# Patient Record
Sex: FEMALE | Race: WHITE | Employment: OTHER | URBAN - METROPOLITAN AREA
[De-identification: names, ages, dates, MRNs, and addresses within clinical notes are randomized per-mention and may not be internally consistent; named-entity substitution may affect disease eponyms.]

---

## 2017-03-10 ASSESSMENT — ENCOUNTER SYMPTOMS
SHORTNESS OF BREATH: 0
ABDOMINAL PAIN: 0

## 2017-03-13 ENCOUNTER — OFFICE VISIT (OUTPATIENT)
Dept: INTERNAL MEDICINE CLINIC | Age: 82
End: 2017-03-13

## 2017-03-13 VITALS
DIASTOLIC BLOOD PRESSURE: 72 MMHG | HEIGHT: 60 IN | SYSTOLIC BLOOD PRESSURE: 132 MMHG | WEIGHT: 143 LBS | BODY MASS INDEX: 28.07 KG/M2 | RESPIRATION RATE: 16 BRPM | HEART RATE: 76 BPM

## 2017-03-13 DIAGNOSIS — E78.00 HYPERCHOLESTEREMIA: ICD-10-CM

## 2017-03-13 DIAGNOSIS — E74.39 GLUCOSE INTOLERANCE (MALABSORPTION): ICD-10-CM

## 2017-03-13 DIAGNOSIS — I10 ESSENTIAL HYPERTENSION: Primary | ICD-10-CM

## 2017-03-13 LAB
A/G RATIO: 1.6 (ref 1.1–2.2)
ALBUMIN SERPL-MCNC: 4.1 G/DL (ref 3.4–5)
ALP BLD-CCNC: 103 U/L (ref 40–129)
ALT SERPL-CCNC: 13 U/L (ref 10–40)
ANION GAP SERPL CALCULATED.3IONS-SCNC: 15 MMOL/L (ref 3–16)
AST SERPL-CCNC: 15 U/L (ref 15–37)
BILIRUB SERPL-MCNC: 0.3 MG/DL (ref 0–1)
BUN BLDV-MCNC: 22 MG/DL (ref 7–20)
CALCIUM SERPL-MCNC: 9.7 MG/DL (ref 8.3–10.6)
CHLORIDE BLD-SCNC: 106 MMOL/L (ref 99–110)
CHOLESTEROL, TOTAL: 233 MG/DL (ref 0–199)
CO2: 23 MMOL/L (ref 21–32)
CREAT SERPL-MCNC: 0.9 MG/DL (ref 0.6–1.2)
GFR AFRICAN AMERICAN: >60
GFR NON-AFRICAN AMERICAN: 60
GLOBULIN: 2.5 G/DL
GLUCOSE BLD-MCNC: 110 MG/DL (ref 70–99)
HDLC SERPL-MCNC: 51 MG/DL (ref 40–60)
LDL CHOLESTEROL CALCULATED: 149 MG/DL
POTASSIUM SERPL-SCNC: 4.8 MMOL/L (ref 3.5–5.1)
SODIUM BLD-SCNC: 144 MMOL/L (ref 136–145)
TOTAL PROTEIN: 6.6 G/DL (ref 6.4–8.2)
TRIGL SERPL-MCNC: 164 MG/DL (ref 0–150)
VLDLC SERPL CALC-MCNC: 33 MG/DL

## 2017-03-13 PROCEDURE — 99214 OFFICE O/P EST MOD 30 MIN: CPT | Performed by: INTERNAL MEDICINE

## 2017-03-13 RX ORDER — PANTOPRAZOLE SODIUM 40 MG/1
TABLET, DELAYED RELEASE ORAL
Qty: 90 TABLET | Refills: 3 | Status: SHIPPED | OUTPATIENT
Start: 2017-03-13 | End: 2018-03-16 | Stop reason: SDUPTHER

## 2017-03-13 RX ORDER — LOVASTATIN 40 MG/1
40 TABLET ORAL DAILY
Qty: 90 TABLET | Refills: 3 | Status: SHIPPED | OUTPATIENT
Start: 2017-03-13 | End: 2017-03-14 | Stop reason: ALTCHOICE

## 2017-03-14 DIAGNOSIS — E78.00 HYPERCHOLESTEREMIA: Primary | ICD-10-CM

## 2017-03-14 RX ORDER — ATORVASTATIN CALCIUM 40 MG/1
40 TABLET, FILM COATED ORAL DAILY
Qty: 90 TABLET | Refills: 3 | OUTPATIENT
Start: 2017-03-14 | End: 2019-03-19 | Stop reason: ALTCHOICE

## 2017-06-29 ENCOUNTER — TELEPHONE (OUTPATIENT)
Dept: INTERNAL MEDICINE CLINIC | Age: 82
End: 2017-06-29

## 2017-06-30 ENCOUNTER — OFFICE VISIT (OUTPATIENT)
Dept: INTERNAL MEDICINE CLINIC | Age: 82
End: 2017-06-30

## 2017-06-30 VITALS
WEIGHT: 144 LBS | DIASTOLIC BLOOD PRESSURE: 72 MMHG | HEART RATE: 80 BPM | RESPIRATION RATE: 16 BRPM | HEIGHT: 60 IN | BODY MASS INDEX: 28.27 KG/M2 | SYSTOLIC BLOOD PRESSURE: 132 MMHG

## 2017-06-30 DIAGNOSIS — H11.30: ICD-10-CM

## 2017-06-30 DIAGNOSIS — E78.00 HYPERCHOLESTEREMIA: Primary | ICD-10-CM

## 2017-06-30 DIAGNOSIS — I10 ESSENTIAL HYPERTENSION: ICD-10-CM

## 2017-06-30 PROCEDURE — 99214 OFFICE O/P EST MOD 30 MIN: CPT | Performed by: INTERNAL MEDICINE

## 2017-06-30 ASSESSMENT — ENCOUNTER SYMPTOMS
SHORTNESS OF BREATH: 0
ABDOMINAL PAIN: 0

## 2017-08-31 ENCOUNTER — TELEPHONE (OUTPATIENT)
Dept: INTERNAL MEDICINE CLINIC | Age: 82
End: 2017-08-31

## 2017-09-12 ASSESSMENT — ENCOUNTER SYMPTOMS
SHORTNESS OF BREATH: 0
ABDOMINAL PAIN: 0

## 2017-09-15 ENCOUNTER — OFFICE VISIT (OUTPATIENT)
Dept: INTERNAL MEDICINE CLINIC | Age: 82
End: 2017-09-15

## 2017-09-15 VITALS
DIASTOLIC BLOOD PRESSURE: 74 MMHG | HEIGHT: 60 IN | HEART RATE: 72 BPM | SYSTOLIC BLOOD PRESSURE: 122 MMHG | BODY MASS INDEX: 27.48 KG/M2 | RESPIRATION RATE: 16 BRPM | WEIGHT: 140 LBS

## 2017-09-15 DIAGNOSIS — Z23 NEED FOR INFLUENZA VACCINATION: ICD-10-CM

## 2017-09-15 DIAGNOSIS — I10 ESSENTIAL HYPERTENSION: ICD-10-CM

## 2017-09-15 DIAGNOSIS — E74.39 GLUCOSE INTOLERANCE (MALABSORPTION): ICD-10-CM

## 2017-09-15 DIAGNOSIS — E78.00 HYPERCHOLESTEREMIA: Primary | ICD-10-CM

## 2017-09-15 LAB
A/G RATIO: 1.4 (ref 1.1–2.2)
ALBUMIN SERPL-MCNC: 3.8 G/DL (ref 3.4–5)
ALP BLD-CCNC: 105 U/L (ref 40–129)
ALT SERPL-CCNC: 13 U/L (ref 10–40)
ANION GAP SERPL CALCULATED.3IONS-SCNC: 13 MMOL/L (ref 3–16)
AST SERPL-CCNC: 13 U/L (ref 15–37)
BILIRUB SERPL-MCNC: 0.3 MG/DL (ref 0–1)
BUN BLDV-MCNC: 28 MG/DL (ref 7–20)
CALCIUM SERPL-MCNC: 9.4 MG/DL (ref 8.3–10.6)
CHLORIDE BLD-SCNC: 107 MMOL/L (ref 99–110)
CHOLESTEROL, TOTAL: 201 MG/DL (ref 0–199)
CO2: 24 MMOL/L (ref 21–32)
CREAT SERPL-MCNC: 0.8 MG/DL (ref 0.6–1.2)
GFR AFRICAN AMERICAN: >60
GFR NON-AFRICAN AMERICAN: >60
GLOBULIN: 2.7 G/DL
GLUCOSE BLD-MCNC: 105 MG/DL (ref 70–99)
HDLC SERPL-MCNC: 42 MG/DL (ref 40–60)
LDL CHOLESTEROL CALCULATED: 122 MG/DL
POTASSIUM SERPL-SCNC: 4.4 MMOL/L (ref 3.5–5.1)
SODIUM BLD-SCNC: 144 MMOL/L (ref 136–145)
TOTAL PROTEIN: 6.5 G/DL (ref 6.4–8.2)
TRIGL SERPL-MCNC: 184 MG/DL (ref 0–150)
VLDLC SERPL CALC-MCNC: 37 MG/DL

## 2017-09-15 PROCEDURE — G0008 ADMIN INFLUENZA VIRUS VAC: HCPCS | Performed by: INTERNAL MEDICINE

## 2017-09-15 PROCEDURE — 99214 OFFICE O/P EST MOD 30 MIN: CPT | Performed by: INTERNAL MEDICINE

## 2017-09-15 PROCEDURE — 90686 IIV4 VACC NO PRSV 0.5 ML IM: CPT | Performed by: INTERNAL MEDICINE

## 2017-12-11 ENCOUNTER — TELEPHONE (OUTPATIENT)
Dept: INTERNAL MEDICINE CLINIC | Age: 82
End: 2017-12-11

## 2018-03-13 ASSESSMENT — ENCOUNTER SYMPTOMS
ABDOMINAL PAIN: 0
SHORTNESS OF BREATH: 0

## 2018-03-13 NOTE — PROGRESS NOTES
Subjective:      Patient ID: Cris Ramon is a 80 y.o. female. HPI  1. Hypercholesteremia --Stable--no new issues----lab noted and reviewed      2. Essential hypertension --Stable--no new issues      3. Glucose intolerance (malabsorption) --Stable--no new issues----lab noted and reviewed      No med changes   Hear mumur sl incr---stress echo--2014  L.r arm tinging--suspect cerv DDD--xary and add tyl-arth--no nsais sec to GI bleed---2 years ago    Review of Systems   Respiratory: Negative for shortness of breath. Cardiovascular: Negative for chest pain. Gastrointestinal: Negative for abdominal pain. Objective:   Physical Exam   HENT:   Head: Normocephalic. Eyes: Pupils are equal, round, and reactive to light. Neck: Normal range of motion. Cardiovascular: Normal rate and normal heart sounds. 2/6 alyssa at [facility] --sl incr    Pulmonary/Chest: Effort normal.   Abdominal: Soft. Musculoskeletal:   Tr edmea   Neg tinel sign --bilat    Neurological: She is alert. Skin: Skin is warm. Assessment:      1. Hypercholesteremia --Continue current therapy      2. Essential hypertension --Continue current therapy      3. Glucose intolerance (malabsorption)     4.  Heart murmur --ck echo  Echocardiogram complete   cerv DDD--and L>R arm tingong--add tyl-aRTH AND GET XRAY---CONSIDER NSAID IN PAST   rto 6mos --ext ov   incr heart murmur---ck echo       Plan:

## 2018-03-16 ENCOUNTER — OFFICE VISIT (OUTPATIENT)
Dept: INTERNAL MEDICINE CLINIC | Age: 83
End: 2018-03-16

## 2018-03-16 VITALS
DIASTOLIC BLOOD PRESSURE: 64 MMHG | RESPIRATION RATE: 16 BRPM | HEART RATE: 80 BPM | SYSTOLIC BLOOD PRESSURE: 128 MMHG | HEIGHT: 60 IN | BODY MASS INDEX: 26.7 KG/M2 | WEIGHT: 136 LBS

## 2018-03-16 DIAGNOSIS — E74.39 GLUCOSE INTOLERANCE (MALABSORPTION): ICD-10-CM

## 2018-03-16 DIAGNOSIS — M50.30 DDD (DEGENERATIVE DISC DISEASE), CERVICAL: ICD-10-CM

## 2018-03-16 DIAGNOSIS — I10 ESSENTIAL HYPERTENSION: ICD-10-CM

## 2018-03-16 DIAGNOSIS — R01.1 HEART MURMUR: ICD-10-CM

## 2018-03-16 DIAGNOSIS — E78.00 HYPERCHOLESTEREMIA: Primary | ICD-10-CM

## 2018-03-16 LAB
A/G RATIO: 1.6 (ref 1.1–2.2)
ALBUMIN SERPL-MCNC: 4.1 G/DL (ref 3.4–5)
ALP BLD-CCNC: 95 U/L (ref 40–129)
ALT SERPL-CCNC: 13 U/L (ref 10–40)
ANION GAP SERPL CALCULATED.3IONS-SCNC: 13 MMOL/L (ref 3–16)
AST SERPL-CCNC: 15 U/L (ref 15–37)
BILIRUB SERPL-MCNC: <0.2 MG/DL (ref 0–1)
BUN BLDV-MCNC: 27 MG/DL (ref 7–20)
CALCIUM SERPL-MCNC: 9.4 MG/DL (ref 8.3–10.6)
CHLORIDE BLD-SCNC: 108 MMOL/L (ref 99–110)
CHOLESTEROL, TOTAL: 211 MG/DL (ref 0–199)
CO2: 24 MMOL/L (ref 21–32)
CREAT SERPL-MCNC: 0.8 MG/DL (ref 0.6–1.2)
GFR AFRICAN AMERICAN: >60
GFR NON-AFRICAN AMERICAN: >60
GLOBULIN: 2.5 G/DL
GLUCOSE BLD-MCNC: 130 MG/DL (ref 70–99)
HDLC SERPL-MCNC: 46 MG/DL (ref 40–60)
LDL CHOLESTEROL CALCULATED: 127 MG/DL
POTASSIUM SERPL-SCNC: 4.4 MMOL/L (ref 3.5–5.1)
SODIUM BLD-SCNC: 145 MMOL/L (ref 136–145)
TOTAL PROTEIN: 6.6 G/DL (ref 6.4–8.2)
TRIGL SERPL-MCNC: 192 MG/DL (ref 0–150)
VLDLC SERPL CALC-MCNC: 38 MG/DL

## 2018-03-16 PROCEDURE — G8400 PT W/DXA NO RESULTS DOC: HCPCS | Performed by: INTERNAL MEDICINE

## 2018-03-16 PROCEDURE — G8419 CALC BMI OUT NRM PARAM NOF/U: HCPCS | Performed by: INTERNAL MEDICINE

## 2018-03-16 PROCEDURE — 4040F PNEUMOC VAC/ADMIN/RCVD: CPT | Performed by: INTERNAL MEDICINE

## 2018-03-16 PROCEDURE — 1090F PRES/ABSN URINE INCON ASSESS: CPT | Performed by: INTERNAL MEDICINE

## 2018-03-16 PROCEDURE — 99214 OFFICE O/P EST MOD 30 MIN: CPT | Performed by: INTERNAL MEDICINE

## 2018-03-16 PROCEDURE — 1123F ACP DISCUSS/DSCN MKR DOCD: CPT | Performed by: INTERNAL MEDICINE

## 2018-03-16 PROCEDURE — G8427 DOCREV CUR MEDS BY ELIG CLIN: HCPCS | Performed by: INTERNAL MEDICINE

## 2018-03-16 PROCEDURE — 1036F TOBACCO NON-USER: CPT | Performed by: INTERNAL MEDICINE

## 2018-03-16 PROCEDURE — G8482 FLU IMMUNIZE ORDER/ADMIN: HCPCS | Performed by: INTERNAL MEDICINE

## 2018-03-16 RX ORDER — PANTOPRAZOLE SODIUM 40 MG/1
TABLET, DELAYED RELEASE ORAL
Qty: 90 TABLET | Refills: 3 | Status: SHIPPED | OUTPATIENT
Start: 2018-03-16 | End: 2021-02-08

## 2018-03-26 ENCOUNTER — HOSPITAL ENCOUNTER (OUTPATIENT)
Dept: NON INVASIVE DIAGNOSTICS | Age: 83
Discharge: HOME | End: 2018-03-26
Attending: INTERNAL MEDICINE | Admitting: INTERNAL MEDICINE

## 2018-03-26 DIAGNOSIS — M50.30 DDD (DEGENERATIVE DISC DISEASE), CERVICAL: ICD-10-CM

## 2018-03-26 DIAGNOSIS — R01.1 CARDIAC MURMUR: ICD-10-CM

## 2018-03-26 LAB
LV EF: 60 %
LVEF MODALITY: NORMAL

## 2018-09-11 ASSESSMENT — ENCOUNTER SYMPTOMS
SHORTNESS OF BREATH: 0
ABDOMINAL PAIN: 0

## 2018-09-14 ENCOUNTER — OFFICE VISIT (OUTPATIENT)
Dept: INTERNAL MEDICINE CLINIC | Age: 83
End: 2018-09-14

## 2018-09-14 VITALS
HEART RATE: 86 BPM | BODY MASS INDEX: 25.13 KG/M2 | SYSTOLIC BLOOD PRESSURE: 128 MMHG | WEIGHT: 128 LBS | RESPIRATION RATE: 16 BRPM | TEMPERATURE: 98.3 F | DIASTOLIC BLOOD PRESSURE: 62 MMHG | OXYGEN SATURATION: 98 % | HEIGHT: 60 IN

## 2018-09-14 DIAGNOSIS — E74.39 GLUCOSE INTOLERANCE (MALABSORPTION): ICD-10-CM

## 2018-09-14 DIAGNOSIS — Z23 NEED FOR INFLUENZA VACCINATION: ICD-10-CM

## 2018-09-14 DIAGNOSIS — I10 ESSENTIAL HYPERTENSION: Primary | ICD-10-CM

## 2018-09-14 DIAGNOSIS — E78.00 HYPERCHOLESTEREMIA: ICD-10-CM

## 2018-09-14 DIAGNOSIS — G56.01 CARPAL TUNNEL SYNDROME OF RIGHT WRIST: ICD-10-CM

## 2018-09-14 LAB
A/G RATIO: 1.7 (ref 1.1–2.2)
ALBUMIN SERPL-MCNC: 4.2 G/DL (ref 3.4–5)
ALP BLD-CCNC: 79 U/L (ref 40–129)
ALT SERPL-CCNC: 19 U/L (ref 10–40)
ANION GAP SERPL CALCULATED.3IONS-SCNC: 13 MMOL/L (ref 3–16)
AST SERPL-CCNC: 16 U/L (ref 15–37)
BILIRUB SERPL-MCNC: 0.3 MG/DL (ref 0–1)
BUN BLDV-MCNC: 21 MG/DL (ref 7–20)
CALCIUM SERPL-MCNC: 9.7 MG/DL (ref 8.3–10.6)
CHLORIDE BLD-SCNC: 105 MMOL/L (ref 99–110)
CHOLESTEROL, TOTAL: 220 MG/DL (ref 0–199)
CO2: 25 MMOL/L (ref 21–32)
CREAT SERPL-MCNC: 0.9 MG/DL (ref 0.6–1.2)
GFR AFRICAN AMERICAN: >60
GFR NON-AFRICAN AMERICAN: 60
GLOBULIN: 2.5 G/DL
GLUCOSE BLD-MCNC: 98 MG/DL (ref 70–99)
HDLC SERPL-MCNC: 47 MG/DL (ref 40–60)
LDL CHOLESTEROL CALCULATED: 133 MG/DL
POTASSIUM SERPL-SCNC: 4.4 MMOL/L (ref 3.5–5.1)
SODIUM BLD-SCNC: 143 MMOL/L (ref 136–145)
TOTAL PROTEIN: 6.7 G/DL (ref 6.4–8.2)
TRIGL SERPL-MCNC: 198 MG/DL (ref 0–150)
VLDLC SERPL CALC-MCNC: 40 MG/DL

## 2018-09-14 PROCEDURE — G8400 PT W/DXA NO RESULTS DOC: HCPCS | Performed by: INTERNAL MEDICINE

## 2018-09-14 PROCEDURE — G8510 SCR DEP NEG, NO PLAN REQD: HCPCS | Performed by: INTERNAL MEDICINE

## 2018-09-14 PROCEDURE — 1090F PRES/ABSN URINE INCON ASSESS: CPT | Performed by: INTERNAL MEDICINE

## 2018-09-14 PROCEDURE — 1036F TOBACCO NON-USER: CPT | Performed by: INTERNAL MEDICINE

## 2018-09-14 PROCEDURE — 4040F PNEUMOC VAC/ADMIN/RCVD: CPT | Performed by: INTERNAL MEDICINE

## 2018-09-14 PROCEDURE — 90662 IIV NO PRSV INCREASED AG IM: CPT | Performed by: INTERNAL MEDICINE

## 2018-09-14 PROCEDURE — 1101F PT FALLS ASSESS-DOCD LE1/YR: CPT | Performed by: INTERNAL MEDICINE

## 2018-09-14 PROCEDURE — 99214 OFFICE O/P EST MOD 30 MIN: CPT | Performed by: INTERNAL MEDICINE

## 2018-09-14 PROCEDURE — G8427 DOCREV CUR MEDS BY ELIG CLIN: HCPCS | Performed by: INTERNAL MEDICINE

## 2018-09-14 PROCEDURE — G8419 CALC BMI OUT NRM PARAM NOF/U: HCPCS | Performed by: INTERNAL MEDICINE

## 2018-09-14 PROCEDURE — 1123F ACP DISCUSS/DSCN MKR DOCD: CPT | Performed by: INTERNAL MEDICINE

## 2018-09-14 PROCEDURE — G0008 ADMIN INFLUENZA VIRUS VAC: HCPCS | Performed by: INTERNAL MEDICINE

## 2018-09-14 ASSESSMENT — PATIENT HEALTH QUESTIONNAIRE - PHQ9
SUM OF ALL RESPONSES TO PHQ QUESTIONS 1-9: 0
SUM OF ALL RESPONSES TO PHQ9 QUESTIONS 1 & 2: 0
2. FEELING DOWN, DEPRESSED OR HOPELESS: 0
1. LITTLE INTEREST OR PLEASURE IN DOING THINGS: 0
SUM OF ALL RESPONSES TO PHQ QUESTIONS 1-9: 0

## 2018-09-14 NOTE — PROGRESS NOTES
Vaccine Information Sheet, \"Influenza - Inactivated\"  given to [employer], or parent/legal guardian of  [facility] and verbalized understanding. Patient responses:    Have you ever had a reaction to a flu vaccine? No  Are you able to eat eggs without adverse effects? Yes  Do you have any current illness? No  Have you ever had Guillian Cornelius Syndrome? No    Flu vaccine given per order. Please see immunization tab.

## 2018-09-19 ENCOUNTER — TELEPHONE (OUTPATIENT)
Dept: INTERNAL MEDICINE CLINIC | Age: 83
End: 2018-09-19

## 2018-09-19 DIAGNOSIS — E78.00 HYPERCHOLESTEREMIA: Primary | ICD-10-CM

## 2019-03-08 PROBLEM — R73.09 ABNORMAL GLUCOSE: Status: ACTIVE | Noted: 2019-03-08

## 2019-03-18 DIAGNOSIS — E78.00 HYPERCHOLESTEREMIA: ICD-10-CM

## 2019-03-21 ENCOUNTER — OFFICE VISIT (OUTPATIENT)
Dept: CARDIOLOGY CLINIC | Age: 84
End: 2019-03-21
Payer: MEDICARE

## 2019-03-21 VITALS
HEIGHT: 60 IN | WEIGHT: 127 LBS | HEART RATE: 82 BPM | DIASTOLIC BLOOD PRESSURE: 70 MMHG | SYSTOLIC BLOOD PRESSURE: 136 MMHG | BODY MASS INDEX: 24.94 KG/M2 | OXYGEN SATURATION: 97 %

## 2019-03-21 DIAGNOSIS — I35.0 NONRHEUMATIC AORTIC VALVE STENOSIS: Primary | ICD-10-CM

## 2019-03-21 DIAGNOSIS — I10 ESSENTIAL HYPERTENSION: ICD-10-CM

## 2019-03-21 DIAGNOSIS — F32.89 OTHER DEPRESSION: ICD-10-CM

## 2019-03-21 PROCEDURE — 93000 ELECTROCARDIOGRAM COMPLETE: CPT | Performed by: INTERNAL MEDICINE

## 2019-03-21 PROCEDURE — G8420 CALC BMI NORM PARAMETERS: HCPCS | Performed by: INTERNAL MEDICINE

## 2019-03-21 PROCEDURE — 99204 OFFICE O/P NEW MOD 45 MIN: CPT | Performed by: INTERNAL MEDICINE

## 2019-03-21 PROCEDURE — G8482 FLU IMMUNIZE ORDER/ADMIN: HCPCS | Performed by: INTERNAL MEDICINE

## 2019-03-21 PROCEDURE — G8427 DOCREV CUR MEDS BY ELIG CLIN: HCPCS | Performed by: INTERNAL MEDICINE

## 2019-03-21 PROCEDURE — 1101F PT FALLS ASSESS-DOCD LE1/YR: CPT | Performed by: INTERNAL MEDICINE

## 2019-03-21 PROCEDURE — 1090F PRES/ABSN URINE INCON ASSESS: CPT | Performed by: INTERNAL MEDICINE

## 2021-02-22 ENCOUNTER — TELEPHONE (OUTPATIENT)
Dept: ORTHOPEDIC SURGERY | Age: 86
End: 2021-02-22

## 2021-02-22 ENCOUNTER — OFFICE VISIT (OUTPATIENT)
Dept: ORTHOPEDIC SURGERY | Age: 86
End: 2021-02-22
Payer: MEDICARE

## 2021-02-22 VITALS — BODY MASS INDEX: 25.13 KG/M2 | TEMPERATURE: 97.2 F | WEIGHT: 128 LBS | HEIGHT: 60 IN

## 2021-02-22 DIAGNOSIS — G56.03 CARPAL TUNNEL SYNDROME, BILATERAL: Primary | ICD-10-CM

## 2021-02-22 PROCEDURE — G8427 DOCREV CUR MEDS BY ELIG CLIN: HCPCS | Performed by: ORTHOPAEDIC SURGERY

## 2021-02-22 PROCEDURE — G8417 CALC BMI ABV UP PARAM F/U: HCPCS | Performed by: ORTHOPAEDIC SURGERY

## 2021-02-22 PROCEDURE — 99203 OFFICE O/P NEW LOW 30 MIN: CPT | Performed by: ORTHOPAEDIC SURGERY

## 2021-02-22 PROCEDURE — 1090F PRES/ABSN URINE INCON ASSESS: CPT | Performed by: ORTHOPAEDIC SURGERY

## 2021-02-22 PROCEDURE — 4040F PNEUMOC VAC/ADMIN/RCVD: CPT | Performed by: ORTHOPAEDIC SURGERY

## 2021-02-22 PROCEDURE — 1036F TOBACCO NON-USER: CPT | Performed by: ORTHOPAEDIC SURGERY

## 2021-02-22 PROCEDURE — G8484 FLU IMMUNIZE NO ADMIN: HCPCS | Performed by: ORTHOPAEDIC SURGERY

## 2021-02-22 PROCEDURE — 1123F ACP DISCUSS/DSCN MKR DOCD: CPT | Performed by: ORTHOPAEDIC SURGERY

## 2021-03-23 DIAGNOSIS — E78.00 HYPERCHOLESTEREMIA: ICD-10-CM

## 2021-03-23 DIAGNOSIS — R73.09 ABNORMAL GLUCOSE: ICD-10-CM

## 2021-03-23 LAB
A/G RATIO: 1.5 (ref 1.1–2.2)
ALBUMIN SERPL-MCNC: 4 G/DL (ref 3.4–5)
ALP BLD-CCNC: 91 U/L (ref 40–129)
ALT SERPL-CCNC: 15 U/L (ref 10–40)
ANION GAP SERPL CALCULATED.3IONS-SCNC: 11 MMOL/L (ref 3–16)
AST SERPL-CCNC: 15 U/L (ref 15–37)
BILIRUB SERPL-MCNC: <0.2 MG/DL (ref 0–1)
BUN BLDV-MCNC: 27 MG/DL (ref 7–20)
CALCIUM SERPL-MCNC: 9.8 MG/DL (ref 8.3–10.6)
CHLORIDE BLD-SCNC: 108 MMOL/L (ref 99–110)
CHOLESTEROL, TOTAL: 235 MG/DL (ref 0–199)
CO2: 23 MMOL/L (ref 21–32)
CREAT SERPL-MCNC: 0.9 MG/DL (ref 0.6–1.2)
GFR AFRICAN AMERICAN: >60
GFR NON-AFRICAN AMERICAN: 59
GLOBULIN: 2.6 G/DL
GLUCOSE BLD-MCNC: 108 MG/DL (ref 70–99)
HDLC SERPL-MCNC: 54 MG/DL (ref 40–60)
LDL CHOLESTEROL CALCULATED: 143 MG/DL
POTASSIUM SERPL-SCNC: 4.5 MMOL/L (ref 3.5–5.1)
SODIUM BLD-SCNC: 142 MMOL/L (ref 136–145)
TOTAL PROTEIN: 6.6 G/DL (ref 6.4–8.2)
TRIGL SERPL-MCNC: 191 MG/DL (ref 0–150)
VLDLC SERPL CALC-MCNC: 38 MG/DL

## 2021-03-24 LAB
ESTIMATED AVERAGE GLUCOSE: 122.6 MG/DL
HBA1C MFR BLD: 5.9 %

## 2021-05-25 ENCOUNTER — TELEPHONE (OUTPATIENT)
Dept: ORTHOPEDIC SURGERY | Age: 86
End: 2021-05-25

## 2021-05-25 NOTE — TELEPHONE ENCOUNTER
The patient is advised regarding the nature of their medical problem and the results of the EMG test.  All treatment options are fully discussed, including surgery, risks, complications, prognosis and postoperative care. She refuses to consider surgery, despite the fact that I reassured her that it was minor surgery. I asked her to call me back if she has any additional questions or wishes to consider surgery.

## 2021-06-07 ENCOUNTER — OFFICE VISIT (OUTPATIENT)
Dept: ORTHOPEDIC SURGERY | Age: 86
End: 2021-06-07
Payer: MEDICARE

## 2021-06-07 VITALS — WEIGHT: 122 LBS | BODY MASS INDEX: 23.95 KG/M2 | RESPIRATION RATE: 16 BRPM | HEIGHT: 60 IN

## 2021-06-07 DIAGNOSIS — G56.03 CARPAL TUNNEL SYNDROME, BILATERAL: Primary | ICD-10-CM

## 2021-06-07 PROCEDURE — G8420 CALC BMI NORM PARAMETERS: HCPCS | Performed by: ORTHOPAEDIC SURGERY

## 2021-06-07 PROCEDURE — 1123F ACP DISCUSS/DSCN MKR DOCD: CPT | Performed by: ORTHOPAEDIC SURGERY

## 2021-06-07 PROCEDURE — 4040F PNEUMOC VAC/ADMIN/RCVD: CPT | Performed by: ORTHOPAEDIC SURGERY

## 2021-06-07 PROCEDURE — 1036F TOBACCO NON-USER: CPT | Performed by: ORTHOPAEDIC SURGERY

## 2021-06-07 PROCEDURE — G8427 DOCREV CUR MEDS BY ELIG CLIN: HCPCS | Performed by: ORTHOPAEDIC SURGERY

## 2021-06-07 PROCEDURE — 1090F PRES/ABSN URINE INCON ASSESS: CPT | Performed by: ORTHOPAEDIC SURGERY

## 2021-06-07 PROCEDURE — 20526 THER INJECTION CARP TUNNEL: CPT | Performed by: ORTHOPAEDIC SURGERY

## 2021-06-07 PROCEDURE — 99213 OFFICE O/P EST LOW 20 MIN: CPT | Performed by: ORTHOPAEDIC SURGERY

## 2021-06-07 RX ORDER — LIDOCAINE HYDROCHLORIDE 10 MG/ML
1 INJECTION, SOLUTION INFILTRATION; PERINEURAL ONCE
Status: COMPLETED | OUTPATIENT
Start: 2021-06-07 | End: 2021-06-07

## 2021-06-07 RX ORDER — TRIAMCINOLONE ACETONIDE 40 MG/ML
40 INJECTION, SUSPENSION INTRA-ARTICULAR; INTRAMUSCULAR ONCE
Status: COMPLETED | OUTPATIENT
Start: 2021-06-07 | End: 2021-06-07

## 2021-06-07 RX ADMIN — LIDOCAINE HYDROCHLORIDE 1 ML: 10 INJECTION, SOLUTION INFILTRATION; PERINEURAL at 15:31

## 2021-06-07 RX ADMIN — TRIAMCINOLONE ACETONIDE 40 MG: 40 INJECTION, SUSPENSION INTRA-ARTICULAR; INTRAMUSCULAR at 15:31

## 2022-06-01 ENCOUNTER — TELEPHONE (OUTPATIENT)
Dept: CARDIOLOGY CLINIC | Age: 87
End: 2022-06-01

## 2022-06-01 ENCOUNTER — NURSE ONLY (OUTPATIENT)
Dept: CARDIOLOGY CLINIC | Age: 87
End: 2022-06-01

## 2022-06-01 DIAGNOSIS — R00.2 PALPITATIONS: Primary | ICD-10-CM

## 2022-06-01 NOTE — TELEPHONE ENCOUNTER
Hannah called in this afternoon, she made an appointment for 6/28 to see Dr. Cox Been she states her heart beats fast when lying down, she would like to know if she should be concerned, and if she should come in sooner. She can be reached at [phone].

## 2022-06-01 NOTE — TELEPHONE ENCOUNTER
Spoke to pt she is having this occur at least 2-3 x's per week.  Pt will call back to be placed on the schedule for a 14 day monitor

## 2022-06-01 NOTE — TELEPHONE ENCOUNTER
Discussed with Dr. Liu Solorio and patient can complete a 7-14 day monitor based on how often she is experiencing her symptoms. Please call patient and make her aware. If this is happening regularly, it can be a 7 day monitor. She can be placed on the MA schedule to have this placed.

## 2022-06-01 NOTE — TELEPHONE ENCOUNTER
Pt came into office to get her cam patch. Placed 7 day cam patch on pt and explained how to use monitor. Patient v/u. Will register pt onto [employer].

## 2022-06-01 NOTE — TELEPHONE ENCOUNTER
Spoke with patient who feels as though her heart will begin racing when she lays down at night. She denies associated symptoms including shortness of breath or chest pain. Will discuss with Dr. Jenni Humphrey if he would like to complete a monitor prior to visit or discuss in office at scheduled visit.

## 2022-06-02 NOTE — PROGRESS NOTES
Pt came into office to get her cam patch. Placed 7 day cam patch on pt and explained how to use monitor. Patient v/u. Patient has been registered successfully on [address]

## 2022-06-14 PROCEDURE — 93244 EXT ECG>48HR<7D REV&INTERPJ: CPT | Performed by: INTERNAL MEDICINE

## 2022-06-14 PROCEDURE — 93242 EXT ECG>48HR<7D RECORDING: CPT | Performed by: INTERNAL MEDICINE

## 2022-06-15 ENCOUNTER — TELEPHONE (OUTPATIENT)
Dept: CARDIOLOGY CLINIC | Age: 87
End: 2022-06-15

## 2022-06-15 DIAGNOSIS — R00.2 PALPITATIONS: ICD-10-CM

## 2022-06-15 NOTE — TELEPHONE ENCOUNTER
Event monitor reviewed by Dr. Liu Solorio and overall normal with mostly normal sinus rhythm with a short run of atrial tachycardia. LVM for patient to return the call.

## 2022-07-28 ENCOUNTER — OFFICE VISIT (OUTPATIENT)
Dept: CARDIOLOGY CLINIC | Age: 87
End: 2022-07-28
Payer: MEDICARE

## 2022-07-28 ENCOUNTER — TELEPHONE (OUTPATIENT)
Dept: CARDIOLOGY CLINIC | Age: 87
End: 2022-07-28

## 2022-07-28 VITALS
HEIGHT: 60 IN | HEART RATE: 103 BPM | WEIGHT: 118 LBS | BODY MASS INDEX: 23.16 KG/M2 | SYSTOLIC BLOOD PRESSURE: 144 MMHG | OXYGEN SATURATION: 98 % | DIASTOLIC BLOOD PRESSURE: 82 MMHG

## 2022-07-28 DIAGNOSIS — I35.0 NONRHEUMATIC AORTIC VALVE STENOSIS: Primary | ICD-10-CM

## 2022-07-28 DIAGNOSIS — E78.5 HYPERLIPIDEMIA LDL GOAL <130: ICD-10-CM

## 2022-07-28 DIAGNOSIS — R06.02 SHORTNESS OF BREATH: ICD-10-CM

## 2022-07-28 PROCEDURE — 99204 OFFICE O/P NEW MOD 45 MIN: CPT | Performed by: INTERNAL MEDICINE

## 2022-07-28 PROCEDURE — 93000 ELECTROCARDIOGRAM COMPLETE: CPT | Performed by: INTERNAL MEDICINE

## 2022-07-28 RX ORDER — IBUPROFEN 200 MG
200 TABLET ORAL EVERY 6 HOURS PRN
COMMUNITY

## 2022-07-28 NOTE — TELEPHONE ENCOUNTER
Patient seen in the office with Dr. Desmond Fay and needs to be scheduled for an echocardiogram.     Please schedule and call patient with date/time.

## 2022-07-28 NOTE — PROGRESS NOTES
[address]  11/10/1933    July 28, 2022    CC: \"I'm okay\"     HPI:  The patient is 80 y.o. female with a past medical history significant for aortic stenosis, hyperlipidemia and GERD. She has not been seen in the office since 3/2019. Dede Bautista presents today for follow up accompanied by her daughter. Her daughter reports she has been experiencing shortness of breath that presents with exertion such as climbing the stairs and even walking into the office today. Her breathing will recover quickly with rest. She is able to lay flat to sleep at night. She denies chest pain with rest or exertion. She has not been participating in regular exercise and her daughter states she is quite sedentary. She was experiencing feelings of her heart racing and the event monitor was completed. She reports the episodes were brief. She states both of her parents had a cardiac history but she cannot recall their exact diagnoses. She denies cardiac history in any of her siblings. Review of Systems:  Constitutional: No fatigue, weakness, night sweats or fever. HEENT: No new vision difficulties or ringing in the ears. Respiratory: No new SOB, PND, orthopnea or cough. Cardiovascular: See HPI   GI: No n/v, diarrhea, constipation, abdominal pain or changes in bowel habits. No melena, no hematochezia  : No urinary frequency, urgency, incontinence, hematuria or dysuria. Skin: No cyanosis or skin lesions. Musculoskeletal: No new muscle or joint pain. Neurological: No syncope or TIA-like symptoms.   Psychiatric: No anxiety, insomnia or depression     Past Medical History:   Diagnosis Date    Acid reflux disease     Pt states that she has gastric reflux    Carpal tunnel syndrome     GERD (gastroesophageal reflux disease)     Hyperlipidemia      Past Surgical History:   Procedure Laterality Date    ARM SURGERY      Right arm surgery; implants involved    HYSTERECTOMY (CERVIX STATUS UNKNOWN)      KNEE SURGERY      Knee replacement 2010     Family History   Problem Relation Age of Onset    Diabetes Father     High Blood Pressure Father     High Blood Pressure Mother      Social History     Tobacco Use    Smoking status: Never    Smokeless tobacco: Never   Substance Use Topics    Alcohol use: No    Drug use: No       No Known Allergies  Current Outpatient Medications   Medication Sig Dispense Refill    ibuprofen (ADVIL;MOTRIN) 200 MG tablet Take 200 mg by mouth every 6 hours as needed for Pain       No current facility-administered medications for this visit. Physical Exam:   BP (!) 144/82   Pulse (!) 103   Ht 5' (1.524 m)   Wt 118 lb (53.5 kg)   SpO2 98%   BMI 23.05 kg/m²   No intake or output data in the 24 hours ending 07/28/22 1401  Wt Readings from Last 2 Encounters:   07/28/22 118 lb (53.5 kg)   03/28/22 118 lb (53.5 kg)     Constitutional: She is oriented to person, place, and time. She appears well-developed and well-nourished. In no acute distress. Head: Normocephalic and atraumatic. Neck: Neck supple. No JVD present. Carotid bruit is not present. No mass and no thyromegaly present. No lymphadenopathy present. Cardiovascular: Normal rate, regular rhythm, normal heart sounds and intact distal pulses. Exam reveals no gallop and no friction rub. No murmur heard. Pulmonary/Chest: Effort normal and breath sounds normal. No respiratory distress. She has no wheezes, rhonchi or rales. Abdominal: Soft, non-tender. Bowel sounds and aorta are normal. She exhibits no organomegaly, mass or bruit. Extremities: No edema, cyanosis, or clubbing. Pulses are 2+ radial/carotid/dorsalis pedis and posterior tibial bilaterally. Neurological: She is alert and oriented to person, place, and time. She has normal reflexes. No cranial nerve deficit. Coordination normal.   Skin: Skin is warm and dry. There is no rash or diaphoresis. Psychiatric: She has a normal mood and affect.  Her speech is normal and behavior is normal.     Personally reviewed and interpreted   EKG Interpretation 3/21/19: Sinus rhythm  EKG Interpretation 7/28/22: Sinus rhythm with normal intervals and axis      Monitor 6/1-6/6/22  Predominant NSR with short run of slow atrial tachycardia 4 beat @ 104 up to 108 bpm   PAC 0.43%   PVC 0.38%       Imaging:     Echo 3/26/18  Borderline concentric left ventricular hypertrophy. EF 60%. The right ventricle is normal in size and function. Mild AS; mean gradient 16 mmHg. Lab Review:   Lab Results   Component Value Date/Time    TRIG 172 09/28/2021 02:50 PM    HDL 52 09/28/2021 02:50 PM    HDL 55 03/16/2012 01:58 PM    LDLCALC 154 09/28/2021 02:50 PM    LDLDIRECT 149 11/20/2015 02:27 PM    LABVLDL 34 09/28/2021 02:50 PM     Lab Results   Component Value Date/Time     03/28/2022 02:20 PM    K 4.8 03/28/2022 02:20 PM    BUN 26 03/28/2022 02:20 PM    CREATININE 0.8 03/28/2022 02:20 PM     No results for input(s): WBC, HGB, HCT, PLT in the last 72 hours. Assessment:  1. Aortic stenosis, nonrheumatic    2. Hyperlipidemia with LDL goal <130 mg/dL   3. Shortness of breath   4. Atrial Tachycardia    Plan:  I will have her complete an echocardiogram to assess for progression of her valve disease. Her blood pressure is slightly elevated today but has been otherwise well controlled. The shortness of breath she describes is likely related to deconditioning and I have encouraged her to increase her activity level as tolerated and adhere to a heart healthy diet. I have personally reviewed all previous testing for this visit today including imaging, lab results and EKG as detailed above. I malia not treat the brief slow runs of atrial tachycardia seen on her monitor. I will see her in office for follow up in 1 year. This note was scribed in the presence of Max Giron MD by General Rawls, RN.   Physician Attestation:  The scribes documentation has been prepared under my direction and personally reviewed by me in its entirety. I, Dr. Justino Seo personally performed the services described in this documentation as scribed by my RN in my presence, and I confirm that the note above accurately reflects all work, treatment, procedures, and medical decision making performed by me.

## 2022-07-28 NOTE — TELEPHONE ENCOUNTER
Called Gloria at 1:25 she said she is not home right now to try to get a hold of her later to schedule her echo

## 2022-07-29 NOTE — TELEPHONE ENCOUNTER
I called and spoke to Hannah to schedule her ECHO and she stated she will call back next week when her family is over to schedule to ECHO so knows when a good day and time is.

## 2022-08-09 NOTE — TELEPHONE ENCOUNTER
Called and spoke to Gisele who stated her family just got in town last night and hasn't had time to talk to them about the ECHO. She states she will call back Thursday to schedule it.

## 2022-08-22 NOTE — TELEPHONE ENCOUNTER
Called and spoke to Maurilio santos this morning trying to get her ECHO scheduled.  She states she was busy at the moment and will once again call the office back to  her ECHO

## 2022-09-27 PROBLEM — G56.02 CARPAL TUNNEL SYNDROME OF LEFT WRIST: Status: ACTIVE | Noted: 2022-09-27

## 2022-10-17 PROBLEM — M25.569 ACUTE KNEE PAIN: Status: ACTIVE | Noted: 2022-10-17

## 2022-12-01 ENCOUNTER — OFFICE VISIT (OUTPATIENT)
Dept: ORTHOPEDIC SURGERY | Age: 87
End: 2022-12-01

## 2022-12-01 VITALS — WEIGHT: 120 LBS | BODY MASS INDEX: 20.49 KG/M2 | HEIGHT: 64 IN

## 2022-12-01 DIAGNOSIS — M17.12 PRIMARY OSTEOARTHRITIS OF LEFT KNEE: Primary | ICD-10-CM

## 2022-12-01 RX ORDER — TRIAMCINOLONE ACETONIDE 40 MG/ML
40 INJECTION, SUSPENSION INTRA-ARTICULAR; INTRAMUSCULAR ONCE
Status: COMPLETED | OUTPATIENT
Start: 2022-12-01 | End: 2022-12-01

## 2022-12-01 RX ORDER — BUPIVACAINE HYDROCHLORIDE 2.5 MG/ML
2 INJECTION, SOLUTION INFILTRATION; PERINEURAL ONCE
Status: COMPLETED | OUTPATIENT
Start: 2022-12-01 | End: 2022-12-01

## 2022-12-01 RX ADMIN — TRIAMCINOLONE ACETONIDE 40 MG: 40 INJECTION, SUSPENSION INTRA-ARTICULAR; INTRAMUSCULAR at 13:28

## 2022-12-01 RX ADMIN — BUPIVACAINE HYDROCHLORIDE 5 MG: 2.5 INJECTION, SOLUTION INFILTRATION; PERINEURAL at 13:25

## 2022-12-02 NOTE — PROGRESS NOTES
This dictation was done with [employer] dictation and may contain mechanical errors related to translation. I have today reviewed with Sheila Randolph the clinically relevant, past medical history, medications, allergies, family history, social history, and Review Of Systems form the patients most recent history form & I have documented any details relevant to today's presenting complaints in my history below. Ms. [de-identified] M Shirley's self-reported past medical history, medications, allergies, family history, social history, and Review Of Systems form has been scanned into the chart under the \"Media\" tab. Subjective:  Sheila Randolph is a 80 y.o. who is here as an established patient complaining of left knee pain over the past few years. It is increased in the last few months that she comes here for evaluation and treatment. She is very pleasant she is alert and oriented x3 and she has a overall genu valgus alignment of her knee. She has pain when she is weightbearing but not so much when she is sitting. She has 2 out of 10 pain currently. She was sent for an AP lateral sunrise view and a tunnel view of her left knee. Patient Active Problem List   Diagnosis    Hypercholesteremia    Osteoarthritis    Allergic rhinitis    Lateral epicondylitis    Other dyspnea and respiratory abnormality    Facial trauma    Acute GI bleeding    Acute blood loss anemia    Diverticulosis of large intestine with hemorrhage    Essential hypertension    Subconjunctival hematoma    Abnormal glucose    Carpal tunnel syndrome, bilateral    Carpal tunnel syndrome of left wrist    Acute knee pain           Current Outpatient Medications on File Prior to Visit   Medication Sig Dispense Refill    ibuprofen (ADVIL;MOTRIN) 200 MG tablet Take 200 mg by mouth every 6 hours as needed for Pain       No current facility-administered medications on file prior to visit.          Objective:   Height 5' 4\" (1.626 m), weight 120 lb (54.4 kg).    On examination is a very pleasant 77-year-old female she is alert and orient x3 she walks with a limp she has a valgus deformity she has a lot of palp tenderness over the lateral joint line she has crepitus during flexion extension through the patellofemoral joint. Medial joint line tenderness but not a true Nadine not a true Lachman was present. She has comfortable internal and external rotation of the hip negative for straight leg raise. Skin good distal pulses with good dorsiflexion plantarflexion strength. Neuro exam grossly intact both lower extremities. Intact sensation to light touch. Motor exam 4+ to 5/5 in all major motor groups. Negative Salinas's sign. Skin is warm, dry and intact with out erythema or significant increased temperature around the knee joint(s). There are no cutaneous lesions or lymphadenopathy present. X-RAYS:  The x-rays taken at the office today show significant genu valgus deformity. She has close to 23 Q angle. She has bone-on-bone osteoarthritis in the lateral compartment. No other fractures or significant bony normalities were noted patella tracking is close to adequate. 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 More than 50 % of the time was counseling and coordination of care as indicated above. We took the time to talk about short and long-term expectations and realistically a total knee replacement may be in her near future. However for therapeutic diagnostic reasons she consented and was injected with cortisone. PROCEDURE NOTE:  After a discussion of the multiple options, they consented to a cortisone shot. 1 ml of 40mg/ml Kenalog and 2 ml's of 0.25%Marcaine were injected into the Left knee joint space.   The leg was slightly flexed and injected just lateral to the patella tendon to under the patella. This was done with sterile technique and they tolerated it well.        They will schedule a follow up in 4 weeks as needed

## 2023-02-25 NOTE — TELEPHONE ENCOUNTER
EMG report has been received. Please call to discuss.   [phone] no diarrhea/no vomiting/no melena

## 2023-05-11 ENCOUNTER — OFFICE VISIT (OUTPATIENT)
Dept: CARDIOLOGY CLINIC | Age: 88
End: 2023-05-11
Payer: MEDICARE

## 2023-05-11 VITALS
SYSTOLIC BLOOD PRESSURE: 136 MMHG | HEART RATE: 86 BPM | WEIGHT: 120 LBS | BODY MASS INDEX: 23.56 KG/M2 | HEIGHT: 60 IN | OXYGEN SATURATION: 98 % | DIASTOLIC BLOOD PRESSURE: 68 MMHG

## 2023-05-11 DIAGNOSIS — I35.0 NONRHEUMATIC AORTIC VALVE STENOSIS: Primary | ICD-10-CM

## 2023-05-11 DIAGNOSIS — I47.1 ATRIAL TACHYCARDIA (HCC): ICD-10-CM

## 2023-05-11 DIAGNOSIS — E78.5 HYPERLIPIDEMIA LDL GOAL <130: ICD-10-CM

## 2023-05-11 PROCEDURE — 99214 OFFICE O/P EST MOD 30 MIN: CPT | Performed by: INTERNAL MEDICINE

## 2023-05-11 PROCEDURE — 1123F ACP DISCUSS/DSCN MKR DOCD: CPT | Performed by: INTERNAL MEDICINE

## 2023-05-11 RX ORDER — BRIMONIDINE TARTRATE 2 MG/ML
SOLUTION/ DROPS OPHTHALMIC
COMMUNITY
Start: 2023-04-12

## 2023-05-11 RX ORDER — DORZOLAMIDE HYDROCHLORIDE AND TIMOLOL MALEATE 20; 5 MG/ML; MG/ML
SOLUTION/ DROPS OPHTHALMIC
COMMUNITY
Start: 2023-02-14

## 2023-05-11 RX ORDER — LATANOPROST 50 UG/ML
SOLUTION/ DROPS OPHTHALMIC
COMMUNITY
Start: 2023-04-08

## 2023-05-11 NOTE — PROGRESS NOTES
replacement 2010     Family History   Problem Relation Age of Onset    Diabetes Father     High Blood Pressure Father     High Blood Pressure Mother      Social History     Tobacco Use    Smoking status: Never    Smokeless tobacco: Never   Substance Use Topics    Alcohol use: No    Drug use: No       No Known Allergies  Current Outpatient Medications   Medication Sig Dispense Refill    VITAMIN D PO Take by mouth daily      ibuprofen (ADVIL;MOTRIN) 200 MG tablet Take 1 tablet by mouth every 6 hours as needed for Pain      brimonidine (ALPHAGAN) 0.2 % ophthalmic solution       dorzolamide-timolol (COSOPT) 22.3-6.8 MG/ML ophthalmic solution       latanoprost (XALATAN) 0.005 % ophthalmic solution        No current facility-administered medications for this visit. Physical Exam:   /68   Pulse 86   Ht 5' (1.524 m)   Wt 120 lb (54.4 kg)   SpO2 98%   BMI 23.44 kg/m²   No intake or output data in the 24 hours ending 05/11/23 1417  Wt Readings from Last 2 Encounters:   05/11/23 120 lb (54.4 kg)   03/28/23 119 lb 12.8 oz (54.3 kg)     Constitutional: She is oriented to person, place, and time. She appears well-developed and well-nourished. In no acute distress. Head: Normocephalic and atraumatic. Neck: Neck supple. No JVD present. Carotid bruit is not present. No mass and no thyromegaly present. No lymphadenopathy present. Cardiovascular: Normal rate, regular rhythm, normal heart sounds and intact distal pulses. Exam reveals no gallop and no friction rub. No murmur heard. Pulmonary/Chest: Effort normal and breath sounds normal. No respiratory distress. She has no wheezes, rhonchi or rales. Abdominal: Soft, non-tender. Bowel sounds and aorta are normal. She exhibits no organomegaly, mass or bruit. Extremities: No edema, cyanosis, or clubbing. Pulses are 2+ radial/carotid/dorsalis pedis and posterior tibial bilaterally. Neurological: She is alert and oriented to person, place, and time.  She has

## 2023-06-29 ENCOUNTER — HOSPITAL ENCOUNTER (OUTPATIENT)
Dept: CT IMAGING | Age: 88
Discharge: HOME | End: 2023-06-29
Payer: MEDICARE

## 2023-06-29 DIAGNOSIS — S09.90XA INJURY OF HEAD, INITIAL ENCOUNTER: ICD-10-CM

## 2023-06-29 PROCEDURE — 70450 CT HEAD/BRAIN W/O DYE: CPT

## 2024-02-26 PROBLEM — F41.9 ANXIETY: Status: ACTIVE | Noted: 2024-02-26

## 2024-04-02 ENCOUNTER — TELEPHONE (OUTPATIENT)
Dept: CARDIOLOGY CLINIC | Age: 89
End: 2024-04-02

## 2024-04-02 DIAGNOSIS — I35.0 NONRHEUMATIC AORTIC VALVE STENOSIS: Primary | ICD-10-CM

## 2024-04-02 NOTE — TELEPHONE ENCOUNTER
Patient needs an echo scheduled to evaluated her aortic stenosis and is due for her yearly follow up with Dr. Orozco ~5/2023. Thank you.

## 2024-04-10 NOTE — TELEPHONE ENCOUNTER
[facility] to assist with scheduling echo and f/u.    Hannah is scheduled for echo 7/26 and OV 7/31.

## 2024-04-10 NOTE — TELEPHONE ENCOUNTER
Called and spoke with [employer]. Was informed that Hannah's daughter will be coming over within an hour and will call back to schedule echo and yearly f/u.   Purse String (Simple) Text: Given the location of the defect and the characteristics of the surrounding skin a purse string simple closure was deemed most appropriate.  Undermining was performed circumferentially around the surgical defect.  A purse string suture was then placed and tightened.

## 2024-07-05 PROBLEM — Z01.818 PRE-OP EVALUATION: Status: ACTIVE | Noted: 2024-07-05

## 2024-07-12 ENCOUNTER — TELEPHONE (OUTPATIENT)
Dept: CARDIOLOGY CLINIC | Age: 89
End: 2024-07-12

## 2024-07-12 NOTE — TELEPHONE ENCOUNTER
The patient has never personally been seen before but in reviewing Dr. Orozco's notes in the chart, there does not appear to be any cardiac contraindication to ophthalmologic surgery.  Follow-up with Dr. Orozco as scheduled.

## 2024-07-12 NOTE — TELEPHONE ENCOUNTER
Dr. Omalley, please review and advise in Dr. Orozco's absence. Thank you.    Patient will be undergoing a tube shunt with patch graft of the left eye at [facility] on 7/15/24. No medications need to be held. Last seen in office on 5/11/23, appointment scheduled 7/31/24. History of AS - repeat echo scheduled 7/26/24. Last echo in 2018 and it was mild. Normal EF.

## 2024-07-12 NOTE — TELEPHONE ENCOUNTER
Prepared cardiac clearance letter, faxed and scanned in chart.   Patient is scheduled to see Dr Orozco on 7/31/24.

## 2024-07-12 NOTE — TELEPHONE ENCOUNTER
Lolita, , inquiring about CC request. States hope to get Hannah cleared before the EOD today. Procedure is Monday.     Please assist.     Lolita gave best fax to send CC straight to her - [phone]

## 2024-07-12 NOTE — TELEPHONE ENCOUNTER
CARDIAC CLEARANCE     URGENT    What type of procedure are you having?  Tube Shunt with patch graft and Mitomycin, Left eye on OS eye    Which physician is performing your procedure?  Dr Conner Day MD    When is your procedure scheduled?  7/15/2024    Where are you having this procedure?  [facility]    Are you taking Blood Thinners? No       Phone Number and Contact Name for Physicians office:  [phone]    Fax number to send information:  [phone]    Cardiac History:  Last office visit with Cardiologist:  5/11/2023  Past medical history significant for aortic stenosis, hyperlipidemia and GERD who presents for management of aortic stenosis.      Cardiac Procedures:  N/A    Cardiac Testing:  EKG Interpretation 3/21/19: Sinus rhythm  EKG Interpretation 7/28/22: Sinus rhythm with normal intervals and axis   Monitor 6/1-6/6/22  Predominant NSR with short run of slow atrial tachycardia 4 beat @ 104 up to 108 bpm   PAC 0.43%   PVC 0.38%    Echo 3/26/18  Borderline concentric left ventricular hypertrophy. EF 60%.  The right ventricle is normal in size and function.  Mild AS; mean gradient 16 mmHg.      CC REQUEST SCANNED INTO CHART  FORM NEEDS FILLED OUT/IN FOLDER

## 2024-07-26 ENCOUNTER — HOSPITAL ENCOUNTER (OUTPATIENT)
Age: 89
Discharge: HOME | End: 2024-07-28
Payer: MEDICARE

## 2024-07-26 DIAGNOSIS — R01.1 HEART MURMUR: Primary | ICD-10-CM

## 2024-07-26 LAB
ECHO AO ROOT DIAM: 2.4 CM
ECHO AV AREA PEAK VELOCITY: 0.9 CM2
ECHO AV AREA VTI: 0.9 CM2
ECHO AV MEAN GRADIENT: 25 MMHG
ECHO AV MEAN VELOCITY: 2.3 M/S
ECHO AV PEAK GRADIENT: 44 MMHG
ECHO AV PEAK VELOCITY: 3.3 M/S
ECHO AV VELOCITY RATIO: 0.33
ECHO AV VTI: 72 CM
ECHO LA AREA 2C: 23 CM2
ECHO LA AREA 4C: 16.4 CM2
ECHO LA MAJOR AXIS: 5.2 CM
ECHO LA MINOR AXIS: 5.6 CM
ECHO LA VOL BP: 58 ML (ref 22–52)
ECHO LA VOL MOD A2C: 76 ML (ref 22–52)
ECHO LA VOL MOD A4C: 41 ML (ref 22–52)
ECHO LV E' LATERAL VELOCITY: 6 CM/S
ECHO LV E' SEPTAL VELOCITY: 7 CM/S
ECHO LV EDV 3D: 84 ML
ECHO LV EJECTION FRACTION 3D: 58 %
ECHO LV ESV 3D: 35 ML
ECHO LV FRACTIONAL SHORTENING: 44 % (ref 28–44)
ECHO LV INTERNAL DIMENSION DIASTOLIC: 3.2 CM (ref 3.9–5.3)
ECHO LV INTERNAL DIMENSION SYSTOLIC: 1.8 CM
ECHO LV IVSD: 0.9 CM (ref 0.6–0.9)
ECHO LV MASS 2D: 97.2 G (ref 67–162)
ECHO LV MASS 3D: 114 G
ECHO LV POSTERIOR WALL DIASTOLIC: 1.2 CM (ref 0.6–0.9)
ECHO LV RELATIVE WALL THICKNESS RATIO: 0.75
ECHO LVOT AREA: 2.8 CM2
ECHO LVOT AV VTI INDEX: 0.32
ECHO LVOT DIAM: 1.9 CM
ECHO LVOT MEAN GRADIENT: 2 MMHG
ECHO LVOT PEAK GRADIENT: 4 MMHG
ECHO LVOT PEAK VELOCITY: 1.1 M/S
ECHO LVOT SV: 64.3 ML
ECHO LVOT VTI: 22.7 CM
ECHO MV A VELOCITY: 1.47 M/S
ECHO MV E DECELERATION TIME (DT): 298 MS
ECHO MV E VELOCITY: 1.05 M/S
ECHO MV E/A RATIO: 0.71
ECHO MV E/E' LATERAL: 17.5
ECHO MV E/E' RATIO (AVERAGED): 16.25
ECHO MV E/E' SEPTAL: 15
ECHO PV MAX VELOCITY: 0.9 M/S
ECHO PV MEAN GRADIENT: 2 MMHG
ECHO PV MEAN VELOCITY: 0.6 M/S
ECHO PV PEAK GRADIENT: 3 MMHG
ECHO PV VTI: 17.7 CM
ECHO RA AREA 4C: 7.2 CM2
ECHO RA VOLUME: 13 ML
ECHO RV BASAL DIMENSION: 2.9 CM
ECHO RV FREE WALL PEAK S': 13 CM/S
ECHO RV MID DIMENSION: 2.2 CM
ECHO RV TAPSE: 2.4 CM (ref 1.7–?)
ECHO TV E WAVE: 0.8 M/S
ECHO TV REGURGITANT MAX VELOCITY: 2.13 M/S
ECHO TV REGURGITANT PEAK GRADIENT: 18 MMHG

## 2024-07-26 PROCEDURE — 93306 TTE W/DOPPLER COMPLETE: CPT

## 2024-07-31 ENCOUNTER — OFFICE VISIT (OUTPATIENT)
Dept: CARDIOLOGY CLINIC | Age: 89
End: 2024-07-31
Payer: MEDICARE

## 2024-07-31 VITALS
DIASTOLIC BLOOD PRESSURE: 62 MMHG | SYSTOLIC BLOOD PRESSURE: 130 MMHG | HEART RATE: 89 BPM | WEIGHT: 115 LBS | HEIGHT: 60 IN | BODY MASS INDEX: 22.58 KG/M2

## 2024-07-31 DIAGNOSIS — I47.19 ATRIAL TACHYCARDIA (HCC): ICD-10-CM

## 2024-07-31 DIAGNOSIS — E78.5 HYPERLIPIDEMIA LDL GOAL <130: ICD-10-CM

## 2024-07-31 DIAGNOSIS — I35.0 NONRHEUMATIC AORTIC VALVE STENOSIS: Primary | ICD-10-CM

## 2024-07-31 PROCEDURE — 99213 OFFICE O/P EST LOW 20 MIN: CPT | Performed by: INTERNAL MEDICINE

## 2024-07-31 PROCEDURE — 1123F ACP DISCUSS/DSCN MKR DOCD: CPT | Performed by: INTERNAL MEDICINE

## 2024-07-31 PROCEDURE — 93000 ELECTROCARDIOGRAM COMPLETE: CPT | Performed by: INTERNAL MEDICINE

## 2024-07-31 NOTE — PROGRESS NOTES
[facility]    Hannah Watson  11/10/1933    July 31, 2024    CC: \" Im here\"    HPI:  The patient is 89 y.o. female with a past medical history significant for aortic stenosis, hyperlipidemia and GERD who presents for management of aortic stenosis. This was noted to be mild on her last echocardiogram in 2018.     Today she presents for follow up accompanied by her daughter Alana.  She is still living at home alone.  She is walking daily through her condo. She uses a rolling walker for stability.  Daughter endorses an increase in shortness of breath when walking down the hallway to bathroom but believes this is due to deconditioning.  She reports medication compliance and is tolerating. She denies any abnormal bleeding or bruising. She denies exertional chest pain/pressure, dyspnea at rest, worsening WOMACK, PND, orthopnea, palpitations, lightheadedness, weight changes, changes in LE edema, and syncope.     Review of Systems:  Constitutional: No fatigue, weakness, night sweats or fever.   HEENT: No new vision difficulties or ringing in the ears.  Respiratory: No new SOB, PND, orthopnea or cough.   Cardiovascular: See HPI   GI: No n/v, diarrhea, constipation, abdominal pain or changes in bowel habits.  No melena, no hematochezia  : No urinary frequency, urgency, incontinence, hematuria or dysuria.  Skin: No cyanosis or skin lesions.  Musculoskeletal: No new muscle or joint pain.  Neurological: No syncope or TIA-like symptoms.  Psychiatric: No anxiety, insomnia or depression     Past Medical History:   Diagnosis Date    Acid reflux disease     Pt states that she has gastric reflux    Carpal tunnel syndrome     GERD (gastroesophageal reflux disease)     Hyperlipidemia      Past Surgical History:   Procedure Laterality Date    ARM SURGERY      Right arm surgery; implants involved    HYSTERECTOMY (CERVIX STATUS UNKNOWN)      KNEE SURGERY      Knee replacement 2010     Family History

## 2024-08-04 PROBLEM — Z01.818 PRE-OP EVALUATION: Status: RESOLVED | Noted: 2024-07-05 | Resolved: 2024-08-04

## 2025-01-14 PROBLEM — R41.3 MEMORY LOSS: Status: ACTIVE | Noted: 2025-01-14

## 2025-05-13 ENCOUNTER — HOSPITAL ENCOUNTER (OUTPATIENT)
Dept: VASCULAR LAB | Age: 89
Discharge: HOME | End: 2025-05-15
Payer: MEDICARE

## 2025-05-13 DIAGNOSIS — M79.662 PAIN OF LEFT CALF: ICD-10-CM

## 2025-05-13 DIAGNOSIS — M25.562 POSTERIOR LEFT KNEE PAIN: ICD-10-CM

## 2025-05-13 LAB — ECHO BSA: 1.46 M2

## 2025-05-13 PROCEDURE — 93971 EXTREMITY STUDY: CPT

## 2025-05-13 PROCEDURE — 93971 EXTREMITY STUDY: CPT | Performed by: SURGERY

## 2025-07-10 ENCOUNTER — OFFICE VISIT (OUTPATIENT)
Dept: ORTHOPEDIC SURGERY | Age: 89
End: 2025-07-10

## 2025-07-10 VITALS — WEIGHT: 114 LBS | BODY MASS INDEX: 23.93 KG/M2 | HEIGHT: 58 IN

## 2025-07-10 DIAGNOSIS — G89.29 CHRONIC PAIN OF LEFT KNEE: Primary | ICD-10-CM

## 2025-07-10 DIAGNOSIS — M25.562 CHRONIC PAIN OF LEFT KNEE: Primary | ICD-10-CM

## 2025-07-10 DIAGNOSIS — M17.12 ARTHRITIS OF LEFT KNEE: ICD-10-CM

## 2025-07-10 RX ORDER — TRIAMCINOLONE ACETONIDE 40 MG/ML
40 INJECTION, SUSPENSION INTRA-ARTICULAR; INTRAMUSCULAR ONCE
Status: COMPLETED | OUTPATIENT
Start: 2025-07-10 | End: 2025-07-10

## 2025-07-10 RX ORDER — BUPIVACAINE HYDROCHLORIDE 2.5 MG/ML
2 INJECTION, SOLUTION INFILTRATION; PERINEURAL ONCE
Status: COMPLETED | OUTPATIENT
Start: 2025-07-10 | End: 2025-07-10

## 2025-07-10 RX ADMIN — BUPIVACAINE HYDROCHLORIDE 5 MG: 2.5 INJECTION, SOLUTION INFILTRATION; PERINEURAL at 14:48

## 2025-07-10 RX ADMIN — TRIAMCINOLONE ACETONIDE 40 MG: 40 INJECTION, SUSPENSION INTRA-ARTICULAR; INTRAMUSCULAR at 14:48

## 2025-07-12 NOTE — PROGRESS NOTES
This dictation was done with Dragon dictation and may contain mechanical errors related to translation.    I have today reviewed with Hannah Watson the clinically relevant, past medical history, medications, allergies, family history, social history, and Review Of Systems form the patient’s most recent history form & I have documented any details relevant to today's presenting complaints in my history below. Ms. Hannah Watson's self-reported past medical history, medications, allergies, family history, social history, and Review Of Systems form has been scanned into the chart under the \"Media\" tab.    Subjective:  Hannah Watson is a 89 y.o. who is here as an established patient having seen her couple years back for left knee osteoarthritis the cortisone shot did help her tremendously.  Recently she saw her primary care physician for some leg pain and swelling they did an ultrasound which was negative on 5/13/2025.  But she says the knee pain specifically is a 6 out of 10.  I sent her for up-to-date x-rays including AP lateral sunrise view and tunnel view      Patient Active Problem List   Diagnosis    Hypercholesteremia    Osteoarthritis    Allergic rhinitis    Lateral epicondylitis    Other dyspnea and respiratory abnormality    Facial trauma    Acute GI bleeding    Acute blood loss anemia    Diverticulosis of large intestine with hemorrhage    Essential hypertension    Subconjunctival hematoma    Abnormal glucose    Carpal tunnel syndrome, bilateral    Carpal tunnel syndrome of left wrist    Acute knee pain    Anxiety    Memory loss           Current Outpatient Medications on File Prior to Visit   Medication Sig Dispense Refill    donepezil (ARICEPT) 5 MG tablet Take 1 pill in morning 90 tablet 1    latanoprost (XALATAN) 0.005 % ophthalmic solution       VITAMIN D PO Take by mouth daily      ibuprofen (ADVIL;MOTRIN) 200 MG tablet Take 1 tablet by mouth every 6 hours as needed for Pain       No